# Patient Record
(demographics unavailable — no encounter records)

---

## 2025-07-15 NOTE — HISTORY OF PRESENT ILLNESS
[FreeTextEntry1] : moles [de-identified] : 43 year old male patient presents today for a mole check.  # Mole/skin check Concerns today: none Sunscreen use: yes Hx of blistering sun burns: yes Hx of Tanning Bed Use: no  Personal history of skin cancer: no Family history of skin cancer: no

## 2025-07-15 NOTE — ASSESSMENT
[FreeTextEntry1] : 1) Nevi/lentigines - Counseled about clinically benign lesions - Discussed regular OTC sunscreen use, SPF 30 or higher   2) Skin cancer Screening - No lesions clinically concerning for malignancy today - Discussed regular self skin checks and ABCDEs of skin cancer screening - Discussed regular sunscreen use - Pt instructed to report any new or changing lesions  RTC 1 yr for FBSE or sooner if any concerns